# Patient Record
(demographics unavailable — no encounter records)

---

## 2025-03-13 NOTE — ASSESSMENT
[FreeTextEntry1] : CLINICAL VOICE EVALUATION REPORT  Date of Evaluation: 2025  Patient Name: Kat Duenas    : 01/15/1941  Primary Diagnosis: Dysphonia    Treatment Diagnosis: Dysphonia  Referring Physician: Dr. Weaver    Procedure Codes: Voice Evaluation - 97568                               Laryngeal Function - 79760   Pertinent Background Information:   Kat Duenas is an 84-year-old female who was referred by Dr. Weaver for a clinical voice evaluation due to findings of presbylarynx. Ms. Duenas arrives to today's evaluation reporting persistent dysphonia which is negatively affecting her overall social related communication.     History of Present Illness:   Ms. Duenas arrived to today's evaluation independently and was a reliable informant. The patient reports a persistent dysphonia marked by a "heavy and raspy" sounding voice. She further states that she feels that her voice "sounds like a man's voice." She notes that voicing concerns began about a year ago but have progressively gotten worse. She feels as though her voice is worse in the morning. She denies any strain or vocal fatigue and chief complaint is concerns with how her voice sounds. She has a history of a hiatal hernia and subsequent GERD. She currently takes omeprazole in the morning and famotidine at night. She reports throat clearing triggered by mucous in the throat, though only in the morning. She denies dyspnea and dysphagia.    MEDICAL HISTORY, per EMR:   Active Problems  Stroke (434.91) (I63.9)  Other chronic pulmonary embolism (416.2) (I27.82)  Small vessel disease, cerebrovascular (437.9) (I67.9)   Past Medical History  Unreviewed Unverified: History of Age-related osteoporosis with current pathological fracture with routine healing, subsequent encounter (M80.00XD)  Unreviewed Unverified: History of Diverticulosis (562.10) (K57.90)  Unreviewed Unverified: History of Hearing difficulty (389.9) (H91.90)  Unreviewed Unverified: History of arthritis (V13.4) (Z87.39)  Unreviewed Unverified: History of glaucoma (V12.49) (Z86.69)  Unreviewed Unverified: History of hypertension (V12.59) (Z86.79)  Unreviewed Unverified: History of hyperthyroidism (V12.29) (Z86.39)  Unreviewed Unverified: History of Other pulmonary embolism without acute cor pulmonale, unspecified chronicity (415.19) (I26.99)   Medications were reviewed and can be found in the patient's medical chart.  Allergies were reviewed and can be found in the patient's medical chart.    CLINICAL FINDINGS:  Perceptual Voice Analysis:  Vocal Quality: breathy, rough  Severity: Moderate  Loudness Level: Low  Musculoskeletal Tension: Absent  Respiration: Thoracic  Resonance: Appropriate  Tone Focus: Back  Type of Onset: Appropriate  Vocal Mulligan: Frequent   Acoustic Analysis  The [employer]ano IV 3950 was utilized to obtain baseline objective vocal function data.  The following information was obtained:   Sustained Phonation:   Fundamental Frequency = 195.18 Hz.  (Normal 174. Hz.) WFLs  Frequency Range = 280.04 Hz (Normal <20 Hz.) Elevated  Habitual Intensity = 73.13 dB. (Normal 65-70 dB.) Elevated  Rate of Perturbation = 2.944% (Normal <.68%) Elevated  Shimmer = 16.898% (Normal <3.81%.) Elevated  Percentage voiced: 84% (Normal 100%). Reduced   Conversational Speech:   Fundamental Frequency = 188.04 Hz. (Normal 156-248.04 Hz.) WFLs  Intensity = 60.92 dB.  (Normal 65-70 dB) Reduced   Maximum Phonation Time: 7 seconds (Normal 15-25 seconds)   Interpretation:  Perceptually, Ms. Duenas presented with a moderate dysphonia characterized by roughness and breathiness.  Acoustic measures indicated elevated intensity in sustained phonation, elevated frequency range, elevated rate of perturbation, elevated shimmer, and reduced percentage voiced.   Voice Handicap Index: Ms. Duenas had a self-rating score of 25/120, suggesting a mild impact upon lifestyle.     IMPRESSIONS:  Ms. Duenas is an 84-year-old female presenting for an initial voice evaluation due to findings of presbylarynx. She reports dysphonia characterized by a raspy and heavy sounding voice. She presents with a moderate dysphonia characterized by breathiness and roughness.    PROGNOSIS:  Good with therapeutic intervention   RECOMMENDATIONS:  Based upon the evaluation results it is recommended that the patient be enrolled in a course of outpatient voice therapy to address the above areas of concern and assess if improvement in overall phonatory functioning can be achieved.     Voice therapy (CPT - 46271) is recommended 1 time a week for 6-8 weeks. This recommendation was discussed and agreed with the patient.   Follow up with referring physician, as directed.    EDUCATION: Counseling and patient education were completed on what a course of voice therapy entails, vocal hygiene recommendations, and SOVT 1-5.    PLAN OF CARE:  Long Term Goals:   To maximize vocal effectiveness relative to the existing laryngeal disorder and to return to vocal activities of daily living/functional voice communication. The patient/family will demonstrate comprehension and adherence to recommendations for vocal hygiene and reflux management to maximize effectiveness in activities of daily living with regard to vocal communication.    Short Term Goals:  Patient will perform passive and active stretches to increase muscular flexibility for coordinated voice production with80% accuracy as evidenced by proper posture, alignment and palpation of extralaryngeal musculature with minimal clinician cues, prompts, and models.   Patient will demonstrate awareness of laryngeal tension and hyperfunction and perform circumlaryngeal massage techniques to decrease tension and effort.  Patient will increase awareness of breath holding pattern as evidenced by ability to discriminate holding vs. release in structured tasks with80% accuracy with minimal clinician cues, prompts, and models.  Patient will perform Basic Training Gesture using (Confidential Voice I Flow Phonation I Resonant Voice) as indicated at80% accuracy withminimal clinician cues, prompts, and models.  Patient will perform open oropharyngeal postures to decrease extralaryngeal constriction in structured tasks with80% accuracy withminimal clinician cues, prompts, and models.  Patient will demonstrate ability to discriminate the target voice (coordinated voice production) from the uncoordinated voice by negative practice with 80% accuracy with minimal clinician cues, prompts, and models.  Patient will produce easy, flow phonation/resonant voice on phonemes, words, phrases, chanting with80% accuracy withminimalclinician cues, prompts, and models.  Patient will produce increased loudness using flow phonation/resonant voice techniques without evidence of strain in structured tasks with80% accuracy withminimal clinician cues, prompts, and models.  Patient will produce RV/flow phonation (coordinated voice production) in different structures, spontaneous situation, e.g. quiet setting, telephone, background noise, emotional topics, speaking at a distance, and conversational challenge with80% accuracy withminimal clinician cues, prompts, and models.   Patient will continue to follow up for medical management and for videostroboscopy as recommended.   Should you have any additional concerns, please contact the Center at [phone].  ARAVIND Nunez., M.S., CF-SLP

## 2025-03-18 NOTE — PHYSICAL EXAM
[FreeTextEntry1] : General physical examination:\par  The patient is well-appearing. VS are stable There is no tenderness over the scalp or neck and no bruits over the eyes or at the neck. There is no proptosis, lid swelling, conjunctival injection, or chemosis. Cardiac exam shows a regular rate and no murmur.\par  Neurologic examination:\par  Mental status:\par  The patient is alert, attentive, and oriented. Speech is clear and fluent with good repetition, comprehension, and naming. Recalls 3/3 objects at 5 minutes.\par  Cranial nerves:\par  CN II: Visual fields are full to confrontation. Pupils are 4 mm and briskly reactive to light. bilaterally.\par  CN III, IV, VI: At primary gaze, there is no eye deviation.EOMI. No ptosis\par  CN V: Facial sensation is intact bilaterally. \par  CN VII: Face is symmetric with normal eye closure and smile.\par  CN VII: Hearing is normal to rubbing fingers\par  CN IX, X: Palate elevates symmetrically. Phonation is normal.\par  CN XI: Head turning and shoulder shrug are intact\par  CN XII: Tongue is midline with normal movements and no atrophy.\par  Motor:\par  There is no pronator drift of out-stretched arms. Muscle bulk and tone are normal. Strength is full bilaterally.\par  	Deltoid	Biceps	Triceps	Wrist extension	Finger abduction	Hip flexion	Hip extension	Knee flexion	Knee extension	Ankle flexion	Ankle extension	\par  L	5	5	5	5	5	5	5	5	5	5	5	\par  R	5	5	5	5	5	5	5	5	5	5	5	\par  Sensory:\par  Light touch intact in fingers and toes.\par  Coordination:\par  Rapid alternating movements and fine finger movements are intact. There is no dysmetria on finger-to-nose and heel-knee-shin. There are no abnormal or extraneous movements. Romberg is absent.\par  Gait/Stance:\par  Posture is normal. Gait is steady with normal steps, base, arm swing, and turning. Heel and toe walking are normal. Tandem gait is normal \par

## 2025-03-18 NOTE — DISCUSSION/SUMMARY
[Intensive Blood Pressure Control] : intensive blood pressure control [Lipid Lowering Therapy] : lipid lowering therapy [Patient encouraged to discuss with Primary MD] : I encouraged the patient to discuss these important issues with ~his/her~ primary care doctor [Goals and Counseling] : I have reviewed the goals of stroke risk factor modification. I counseled the patient on measures to reduce stroke risk, including the importance of medication compliance, risk factor control, exercise, healthy diet and avoidance of smoking. I reviewed stroke warning signs and symptoms and appropriate actions to take if such occur. [FreeTextEntry1] : Impression: During a ENT workup for hearing loss she had an MRI that reportedly noted some "bleeding" and "BL pontine artery occlusions". She does not have reports and we are unable to open the CD in the office.   I reviewed recent imaging - 08/05/23 - MRI Brain and MRA head - reviewed in detail with the patient - I showed the Fair -MRI sequence - shows ischemic cerebrovascular disease. MRA -- no stenosis or occlusion   Continue anti-c per Hematology, risk factor control.   She does have a hx of PE (5-6 years ago) and is on Xarelto. She notes PE etiology workup was negative in the past.   Continue Xarelto for history of PE. Continue to follow up with PCP/cardiology for BP and statin management (goal LDL <70) as well as all routine primary care needs. She will be on lifelong Xarelto so screening for atrial fib may not .Will reevaluate next visit. We discussed aggressive vascular strict risk factor control.  antalgic gait -likely due to arthritis - [facility] PT - reinforced   Transcranial and Carotid Doppler's with BL mild ICA stenosis. Follow up with PCP regarding incident thyroid nodule noted on Doppler.    Patient and family were educated on signs and symptoms of stroke and to contact 911 immediately if experiencing any.   Consult note sent to: Hematology: Dr. Moshe Lee [phone]  PCP: Dr. Scott Mendel [phone]

## 2025-03-18 NOTE — HISTORY OF PRESENT ILLNESS
[FreeTextEntry1] : Mrs. Duenas is a 82 year old right handed female PMHx HTN, L partial thyroidectomy, CKD, BL glaucoma, hearing loss, and PE (5 years ago) who presents today for consultation regarding abnormal imaging (does not have reports).  No new neurological symptoms: she is on anti-c per  - for Pulmonary embolism years ago - no specific cause identified.   She is an RN. She notes 5 -6 years ago while at work she developed chest pain and was taken to ED and found to have a PE and started on Xarelto. She notes that workup for PE done by outside hematology was negative. Recently she went to an ENT for evaluation of hearing aids and they suggested an MRI head. Reportedly MRI showed some old bleeding and she was referred to a general neurologist who referred her to us. She does not have reports from MRI or where the "bleeding was". She has one form that says "BL pontine artery occlusion" but no other information is provided. She remains on Xarelto and follows with hematologist Dr. Moshe Lee. She denies any stroke like symptoms in the past.   2/21/2024 Today reports feeling well. She is working with her PCP and cardiology to get BP <140/90. She is restarting PT for back and knee pain and tolerating well. Denies any new neurological symptoms.   3/18/2025- Has bilateral hearing loss No new neurological symptoms.